# Patient Record
(demographics unavailable — no encounter records)

---

## 2025-04-09 NOTE — HISTORY OF PRESENT ILLNESS
[FreeTextEntry1] : Pt is 51year old male with PMH of HTN, Ischemic Cardiomyopathy HFrEF 35%, STEMI on 4/30/23, Ostial  % thrombotic occlusion s/p PCI  Denies SOB, no chest pain, no more tachycardic episodes Pt has Panic attacks and is followed by psych + improvement with Escitalopram 10mg  Pt denies chest pain, no SOB, no palpitations.   Pt resumed prior activities resolved  palpitations Has episodes of B/p 130/90 when he wakes up at night  05/04/25 TSH 2.12 Chol 134 LDL 78 Trig 130 HgbA1C 5.8 on rosuvastatin 10mg

## 2025-04-09 NOTE — ASSESSMENT
[FreeTextEntry1] : Assessment: #CAD, S/p STEMI, S/P PCI 5/23  #CHF #HLD   #Anxiety    Plan: Carotid Duplex  D/c Prasugrel  May proceed with colonoscopy as scheduled cont ASA 81mg QD - moderate risk for MACE Cont rest of meds repeat labs and stress test reviewed and d/w the pt Activities as tolerated Aggressive risks modifications f/u after testing  F/u in 6 months   att note pt seen and examined agree with above